# Patient Record
Sex: FEMALE | Race: ASIAN | NOT HISPANIC OR LATINO | URBAN - METROPOLITAN AREA
[De-identification: names, ages, dates, MRNs, and addresses within clinical notes are randomized per-mention and may not be internally consistent; named-entity substitution may affect disease eponyms.]

---

## 2021-08-24 ENCOUNTER — INPATIENT (INPATIENT)
Facility: HOSPITAL | Age: 71
LOS: 2 days | Discharge: HOME | End: 2021-08-27
Attending: INTERNAL MEDICINE | Admitting: INTERNAL MEDICINE
Payer: MEDICARE

## 2021-08-24 VITALS
TEMPERATURE: 97 F | DIASTOLIC BLOOD PRESSURE: 50 MMHG | SYSTOLIC BLOOD PRESSURE: 94 MMHG | HEART RATE: 94 BPM | RESPIRATION RATE: 18 BRPM | OXYGEN SATURATION: 98 %

## 2021-08-24 LAB
ALBUMIN SERPL ELPH-MCNC: 3.9 G/DL — SIGNIFICANT CHANGE UP (ref 3.3–5)
ALP SERPL-CCNC: 49 U/L — SIGNIFICANT CHANGE UP (ref 40–120)
ALT FLD-CCNC: 15 U/L — SIGNIFICANT CHANGE UP (ref 4–33)
ANION GAP SERPL CALC-SCNC: 14 MMOL/L — SIGNIFICANT CHANGE UP (ref 7–14)
APTT BLD: 27.1 SEC — SIGNIFICANT CHANGE UP (ref 27–36.3)
AST SERPL-CCNC: 15 U/L — SIGNIFICANT CHANGE UP (ref 4–32)
BASOPHILS # BLD AUTO: 0.02 K/UL — SIGNIFICANT CHANGE UP (ref 0–0.2)
BASOPHILS NFR BLD AUTO: 0.3 % — SIGNIFICANT CHANGE UP (ref 0–2)
BILIRUB SERPL-MCNC: 0.5 MG/DL — SIGNIFICANT CHANGE UP (ref 0.2–1.2)
BLD GP AB SCN SERPL QL: NEGATIVE — SIGNIFICANT CHANGE UP
BUN SERPL-MCNC: 32 MG/DL — HIGH (ref 7–23)
CALCIUM SERPL-MCNC: 8.4 MG/DL — SIGNIFICANT CHANGE UP (ref 8.4–10.5)
CHLORIDE SERPL-SCNC: 102 MMOL/L — SIGNIFICANT CHANGE UP (ref 98–107)
CO2 SERPL-SCNC: 19 MMOL/L — LOW (ref 22–31)
CREAT SERPL-MCNC: 0.56 MG/DL — SIGNIFICANT CHANGE UP (ref 0.5–1.3)
EOSINOPHIL # BLD AUTO: 0 K/UL — SIGNIFICANT CHANGE UP (ref 0–0.5)
EOSINOPHIL NFR BLD AUTO: 0 % — SIGNIFICANT CHANGE UP (ref 0–6)
GLUCOSE SERPL-MCNC: 218 MG/DL — HIGH (ref 70–99)
HCT VFR BLD CALC: 27.2 % — LOW (ref 34.5–45)
HGB BLD-MCNC: 8.9 G/DL — LOW (ref 11.5–15.5)
IANC: 5.63 K/UL — SIGNIFICANT CHANGE UP (ref 1.5–8.5)
IMM GRANULOCYTES NFR BLD AUTO: 0.6 % — SIGNIFICANT CHANGE UP (ref 0–1.5)
INR BLD: 1.2 RATIO — HIGH (ref 0.88–1.16)
LYMPHOCYTES # BLD AUTO: 0.68 K/UL — LOW (ref 1–3.3)
LYMPHOCYTES # BLD AUTO: 10 % — LOW (ref 13–44)
MCHC RBC-ENTMCNC: 31.2 PG — SIGNIFICANT CHANGE UP (ref 27–34)
MCHC RBC-ENTMCNC: 32.7 GM/DL — SIGNIFICANT CHANGE UP (ref 32–36)
MCV RBC AUTO: 95.4 FL — SIGNIFICANT CHANGE UP (ref 80–100)
MONOCYTES # BLD AUTO: 0.45 K/UL — SIGNIFICANT CHANGE UP (ref 0–0.9)
MONOCYTES NFR BLD AUTO: 6.6 % — SIGNIFICANT CHANGE UP (ref 2–14)
NEUTROPHILS # BLD AUTO: 5.63 K/UL — SIGNIFICANT CHANGE UP (ref 1.8–7.4)
NEUTROPHILS NFR BLD AUTO: 82.5 % — HIGH (ref 43–77)
NRBC # BLD: 0 /100 WBCS — SIGNIFICANT CHANGE UP
NRBC # FLD: 0 K/UL — SIGNIFICANT CHANGE UP
PLATELET # BLD AUTO: 170 K/UL — SIGNIFICANT CHANGE UP (ref 150–400)
POTASSIUM SERPL-MCNC: 4 MMOL/L — SIGNIFICANT CHANGE UP (ref 3.5–5.3)
POTASSIUM SERPL-SCNC: 4 MMOL/L — SIGNIFICANT CHANGE UP (ref 3.5–5.3)
PROT SERPL-MCNC: 6.4 G/DL — SIGNIFICANT CHANGE UP (ref 6–8.3)
PROTHROM AB SERPL-ACNC: 13.6 SEC — SIGNIFICANT CHANGE UP (ref 10.6–13.6)
RBC # BLD: 2.85 M/UL — LOW (ref 3.8–5.2)
RBC # FLD: 12.4 % — SIGNIFICANT CHANGE UP (ref 10.3–14.5)
RH IG SCN BLD-IMP: POSITIVE — SIGNIFICANT CHANGE UP
RH IG SCN BLD-IMP: POSITIVE — SIGNIFICANT CHANGE UP
SODIUM SERPL-SCNC: 135 MMOL/L — SIGNIFICANT CHANGE UP (ref 135–145)
WBC # BLD: 6.82 K/UL — SIGNIFICANT CHANGE UP (ref 3.8–10.5)
WBC # FLD AUTO: 6.82 K/UL — SIGNIFICANT CHANGE UP (ref 3.8–10.5)

## 2021-08-24 PROCEDURE — 71045 X-RAY EXAM CHEST 1 VIEW: CPT | Mod: 26

## 2021-08-24 RX ORDER — ONDANSETRON 8 MG/1
4 TABLET, FILM COATED ORAL ONCE
Refills: 0 | Status: COMPLETED | OUTPATIENT
Start: 2021-08-24 | End: 2021-08-24

## 2021-08-24 RX ORDER — PANTOPRAZOLE SODIUM 20 MG/1
80 TABLET, DELAYED RELEASE ORAL ONCE
Refills: 0 | Status: COMPLETED | OUTPATIENT
Start: 2021-08-24 | End: 2021-08-24

## 2021-08-24 RX ORDER — FAMOTIDINE 10 MG/ML
20 INJECTION INTRAVENOUS ONCE
Refills: 0 | Status: COMPLETED | OUTPATIENT
Start: 2021-08-24 | End: 2021-08-24

## 2021-08-24 RX ADMIN — FAMOTIDINE 20 MILLIGRAM(S): 10 INJECTION INTRAVENOUS at 21:45

## 2021-08-24 RX ADMIN — ONDANSETRON 4 MILLIGRAM(S): 8 TABLET, FILM COATED ORAL at 21:43

## 2021-08-24 RX ADMIN — PANTOPRAZOLE SODIUM 80 MILLIGRAM(S): 20 TABLET, DELAYED RELEASE ORAL at 21:40

## 2021-08-24 NOTE — ED PROVIDER NOTE - CLINICAL SUMMARY MEDICAL DECISION MAKING FREE TEXT BOX
69yo female with pmh htn presenting with hematemesis, abdominal pain.  Actively vomiting upon arrival.  Concern for complication 2/2 egd.  Not peritoneal on exam, lower concern for perforation.  Will get labs including type and monitor closely.  BP soft but hemodynamically stable currently.  Medicate and reassess after results.  Plan to admit.

## 2021-08-24 NOTE — ED ADULT TRIAGE NOTE - CHIEF COMPLAINT QUOTE
Pt c/o of weakness, blood in emesis and stool s/p endoscopy on 8/20. Pt appears lethargic, unsteady of gait, pale/jaudice appearing at triage. Charge nurse called, taken directly to TrA

## 2021-08-24 NOTE — ED PROVIDER NOTE - PHYSICAL EXAMINATION
General appearance: NAD, conversant, afebrile    Neck: Trachea midline; Full range of motion, supple   Pulm: CTA bl, normal respiratory effort and no intercostal retractions, normal work of breathing   CV: RRR, No murmurs, rubs, or gallops   Abdomen: Soft, non-tender, non-distended; no guarding or rebound   Extremities: No peripheral edema    Skin: Dry, normal temperature, turgor and texture; no rash, pale appearing   Psych: Appropriate affect, cooperative; alert and oriented to person, place and time

## 2021-08-24 NOTE — ED ADULT NURSE NOTE - OBJECTIVE STATEMENT
Pt received to YISEL Serrano&OX4, ambulatory. Non- english speaking, son at bedside for translation. Pt got endoscopy on 8/20 as Pt received to YISEL Serrano&OX4, ambulatory. Non- english speaking, primarily Fuijanese speaking. son Omar Doshi at bedside for translation. Pt got endoscopy on 8/20 as recommended by her GI doctor, son and pt state they are unsure as to why. C/o one episode of bloody stools around noon today and one episode of vomit around 2pm today. Pt currently c/o RUQ abd pain. Respirations even and unlabored. NSR on CM. Denies CP, SOB, dizziness, fatigue. 20G IV placed to RAC. Labs sent. Will continue to monitor.

## 2021-08-24 NOTE — ED PROVIDER NOTE - OBJECTIVE STATEMENT
71yo female with pmh htn presenting with hematemesis, abdominal pain.  4 days ago had egd for upper abdominal pain with biopsy performed.  Has been feeling generally unwell since that time.  Vomited bright red blood this afternoon and presented actively vomiting.  Procedure performed by [facility].

## 2021-08-24 NOTE — ED PROVIDER NOTE - PROGRESS NOTE DETAILS
Blake Andres DO PGY-3: pt received as a sing out [id number] - did not have baseline hemoglobin in chart - given pt had hematemesis - 1u prbc was ordered. pt did not have any episode of hematemesis since 11pm. BP has been MAP >65. Pt had continued to deny abd pain. Sent an email to GI. Attempted to admit the patient around 2a - hospitalist refused the admission since patient had a recent EGD - states patient should have a ctab to evl for perforation. Ct was ordered - still not done due to ER volume today. Pt continues to deny ab pain. WIll continue to monitor. Pt two functional IV lines. Rpt cbc is ordered. Lambert Luna MD - EM Fellow: 71yo F w/ PMHx of HTN reporting to ED with hematemesis. Patient signed out during routine sign out by colleague, Dr. Andres; please refer to colleague's note for further information regarding this patient. In brief, patient had hematemesis after prior endoscopy 4 days prior. Patient's Hgb downtrended from 9.5 to 8.9 and had received 1uPRBC. Prior to my evaluation. Patient was alert, orientated, and not in extremis upon my evaluation; abdominal exam reassuring. CT abdomen pending which ultimately showed no free air. Protonix bolus and drip. Zofran, fluids initiated. COVID-19 positive. Spoke with hospitalist, Dr. Rivera, who was understanding and agreeable to admit this patient. Patient admitted in stable condition.

## 2021-08-24 NOTE — ED PROVIDER NOTE - ATTENDING CONTRIBUTION TO CARE
I have seen and examined the patient on the patient´s visit date. I have reviewed the note written by Blake Andres DO, on that visit day. I have supervised and participated as necessary in the performance of procedures indicated for patient management and was available at all phases of the patient´s visit when needed. We discussed the history, physical exam findings, management plan, and  medical decision making. I have made my additions, exceptions, and revisions within the chart and I agree with H and P as documented in its entirety. The data and my interpretation of any data collected from labs, interventions and imaging appear below as well as my independent medical decision making and considerations.  CRITICAL CARE TIME WAS NECESSARY TO TREAT OR PREVENT LIFE THREATENING DETERIORATION FROM THE FOLLOWING CONDITIONS:  [ X ] Heart Failure and/or Circulatory Collapse [  ] Sepsis [  ] Respiratory failure  [  ]CNS Failure or Compromise    [  ]Dehydration [  ]Renal Failure [  ]Hepatic Failure Sepsis [  ]Endocrine Crisis  [  ]Metabolic Crisis  [  ]Toxidrome   [  ]Trauma    CRITICAL CARE TIME WAS SPENT BY ME IN THE FOLLOWING ACTIVITIES:  [X  ] Performance of the History and Physical Examination [X  ] Review of Old Charts [   ] IV Access and or Obtaining Necessary Diagnostic Tests   [X  ] Ordering and Performing Treatments and Interventions:   Specifically:  [  ] Ventilator Management [  ] Vascular Access Procedures [  ] NGT Placement  [  ] Transcutaneous Pacing  [ X ] Establishment of Goals of Care with the Patient or Their Designated Representative  [X  ] Developing and Evaluating Treatment Plan with Consultants and/or the Primary Provider  [ X ] Serial Reevaluation of the Patients Condition and Response to Therapeutic Measures   Specifically: [ X ] Interpretation of Cardiac and Respiratory Parameters  [X  ]Ordering and Interpretating  Diagnostic Studies  Comments:  The patient is a 71y Female who has a past medical and surgery history of HTN gastritis/PUD/HPylori infection PTED days post endoscopy with large volume hematemesis preceded by nausea but no profuse vomiting. EGD performed at outside institution pt /son not aware of results   ,      Vital Signs Last 24 Hrs   BP 94/50 HR 94 RR 18 SPO2 98% Tf= 97.2   PE: as described; my additions and exceptions are noted in the chart  DATA:  EKG: Normal; NSR@88  LABS: All significant/relevant labs and imaging results present during the time of evaluation have been entered into chart through pullset function and are discussed below    MDM:  IMPRESSION: Large volume UGIB  Considerations; permissive hypotension with early blood product support to optimize for inpt repeat EGD  PLAN  As above  PPI iV   no suspicion for varices so will hold abx  early GI consult  serial cbcs  tx prbcs  for goal of sbp 90's (map60) HR 90s pOX in 90s and 1/2 cc/kg/hr urine output  admit

## 2021-08-25 DIAGNOSIS — D64.9 ANEMIA, UNSPECIFIED: ICD-10-CM

## 2021-08-25 DIAGNOSIS — K92.0 HEMATEMESIS: ICD-10-CM

## 2021-08-25 DIAGNOSIS — Z29.9 ENCOUNTER FOR PROPHYLACTIC MEASURES, UNSPECIFIED: ICD-10-CM

## 2021-08-25 DIAGNOSIS — U07.1 COVID-19: ICD-10-CM

## 2021-08-25 DIAGNOSIS — I10 ESSENTIAL (PRIMARY) HYPERTENSION: ICD-10-CM

## 2021-08-25 LAB
CRP SERPL-MCNC: <4 MG/L — SIGNIFICANT CHANGE UP
D DIMER BLD IA.RAPID-MCNC: 342 NG/ML DDU — HIGH
FERRITIN SERPL-MCNC: 119 NG/ML — SIGNIFICANT CHANGE UP (ref 15–150)
HCT VFR BLD CALC: 28 % — LOW (ref 34.5–45)
HCT VFR BLD CALC: 30.7 % — LOW (ref 34.5–45)
HGB BLD-MCNC: 10.1 G/DL — LOW (ref 11.5–15.5)
HGB BLD-MCNC: 9.5 G/DL — LOW (ref 11.5–15.5)
LDH SERPL L TO P-CCNC: 161 U/L — SIGNIFICANT CHANGE UP (ref 135–225)
MCHC RBC-ENTMCNC: 30.9 PG — SIGNIFICANT CHANGE UP (ref 27–34)
MCHC RBC-ENTMCNC: 31.4 PG — SIGNIFICANT CHANGE UP (ref 27–34)
MCHC RBC-ENTMCNC: 32.9 GM/DL — SIGNIFICANT CHANGE UP (ref 32–36)
MCHC RBC-ENTMCNC: 33.9 GM/DL — SIGNIFICANT CHANGE UP (ref 32–36)
MCV RBC AUTO: 92.4 FL — SIGNIFICANT CHANGE UP (ref 80–100)
MCV RBC AUTO: 93.9 FL — SIGNIFICANT CHANGE UP (ref 80–100)
NRBC # BLD: 0 /100 WBCS — SIGNIFICANT CHANGE UP
NRBC # BLD: 0 /100 WBCS — SIGNIFICANT CHANGE UP
NRBC # FLD: 0 K/UL — SIGNIFICANT CHANGE UP
NRBC # FLD: 0 K/UL — SIGNIFICANT CHANGE UP
PLATELET # BLD AUTO: 133 K/UL — LOW (ref 150–400)
PLATELET # BLD AUTO: 141 K/UL — LOW (ref 150–400)
PROCALCITONIN SERPL-MCNC: 0.12 NG/ML — HIGH (ref 0.02–0.1)
RBC # BLD: 3.03 M/UL — LOW (ref 3.8–5.2)
RBC # BLD: 3.27 M/UL — LOW (ref 3.8–5.2)
RBC # FLD: 13.4 % — SIGNIFICANT CHANGE UP (ref 10.3–14.5)
RBC # FLD: 14 % — SIGNIFICANT CHANGE UP (ref 10.3–14.5)
SARS-COV-2 RNA SPEC QL NAA+PROBE: DETECTED
WBC # BLD: 5.65 K/UL — SIGNIFICANT CHANGE UP (ref 3.8–10.5)
WBC # BLD: 6.13 K/UL — SIGNIFICANT CHANGE UP (ref 3.8–10.5)
WBC # FLD AUTO: 5.65 K/UL — SIGNIFICANT CHANGE UP (ref 3.8–10.5)
WBC # FLD AUTO: 6.13 K/UL — SIGNIFICANT CHANGE UP (ref 3.8–10.5)

## 2021-08-25 PROCEDURE — 74176 CT ABD & PELVIS W/O CONTRAST: CPT | Mod: 26

## 2021-08-25 PROCEDURE — 99223 1ST HOSP IP/OBS HIGH 75: CPT

## 2021-08-25 RX ORDER — PANTOPRAZOLE SODIUM 20 MG/1
8 TABLET, DELAYED RELEASE ORAL
Qty: 80 | Refills: 0 | Status: DISCONTINUED | OUTPATIENT
Start: 2021-08-25 | End: 2021-08-27

## 2021-08-25 RX ORDER — OMEPRAZOLE 10 MG/1
1 CAPSULE, DELAYED RELEASE ORAL
Qty: 0 | Refills: 0 | DISCHARGE

## 2021-08-25 RX ORDER — CHOLECALCIFEROL (VITAMIN D3) 125 MCG
1 CAPSULE ORAL
Qty: 0 | Refills: 0 | DISCHARGE

## 2021-08-25 RX ADMIN — PANTOPRAZOLE SODIUM 10 MG/HR: 20 TABLET, DELAYED RELEASE ORAL at 11:14

## 2021-08-25 NOTE — H&P ADULT - PROBLEM SELECTOR PLAN 2
Anemia work up  (iron studies, B12, Folate.)   Will monitor H/H.    Pt currently asymptomatic and hemodynamically stable.   Guaiac stools.  GI consult recs noted

## 2021-08-25 NOTE — H&P ADULT - NEGATIVE RESPIRATORY AND THORAX SYMPTOMS
no wheezing/no dyspnea/no cough Melolabial Transposition Flap Text: The defect edges were debeveled with a #15 scalpel blade.  Given the location of the defect and the proximity to free margins a melolabial flap was deemed most appropriate.  Using a sterile surgical marker, an appropriate melolabial transposition flap was drawn incorporating the defect.    The area thus outlined was incised deep to adipose tissue with a #15 scalpel blade.  The skin margins were undermined to an appropriate distance in all directions utilizing iris scissors.

## 2021-08-25 NOTE — ED ADULT NURSE REASSESSMENT NOTE - NS ED NURSE REASSESS COMMENT FT1
report received from night RN. Pt A&Ox4, ambulatory with assistance d/t weakness. NSR on cardiac monitor. Respirations even and unlabored on room air. Isolation precautions maintained for + COVID. Pt assisted to use bed pan. Fall and safety precautions maintained. Pt taken to CT scan. Pending dispo.

## 2021-08-25 NOTE — H&P ADULT - HISTORY OF PRESENT ILLNESS
69 y/o female with PMHx of HTN, H pylori s/p treatment in 2005, ovarian tumor s/p removal in 2013 presents to ED vomiting bloody material and abdominal pain. Patient and son states she had EGD 4 days ago with  Dr Leija for biopsy of 8-9 mm gastric mass. She states she felt poorly ever since the endoscopy and yesterday she had one bout of hematemesis.  She states this was bright red blood with a couple "dark flecks" of material intermixed. Patient was actively vomiting in the ED. Patient denies fever/chills, fatigue, weakness, weight change, chest pain, difficulty breathing, change in bowel movements, dysuria, change in urination, lower extremity edema, hemiparesis, change in gait, appetite changes, rashes.       In ED, patient was found to be Covid positive. As per son, patient was vaccinated with Pfizer vaccine, last dose 3/27/21. Patient was tested prior to EGD 4 days ago. 71 y/o female with PMHx of HTN, H pylori s/p treatment in 2005, ovarian tumor s/p removal in 2013 presents to ED vomiting bloody material and abdominal pain. Patient and son states she had EGD 4 days ago with  Dr Leija for biopsy of 8-9 mm gastric mass. She states she felt poorly ever since the endoscopy and yesterday she had one bout of hematemesis.  She states this was bright red blood with a couple "dark flecks" of material intermixed. Patient was actively vomiting in the ED. Patient denies fever/chills, fatigue, weakness, weight change, chest pain, difficulty breathing, change in bowel movements, dysuria, change in urination, lower extremity edema, hemiparesis, change in gait, appetite changes, rashes.       In ED, patient was found to be Covid positive. As per son, patient was vaccinated with Pfizer vaccine, last dose 3/27/21. Patient was not tested prior to EGD 4 days ago.

## 2021-08-25 NOTE — H&P ADULT - ATTENDING COMMENTS
69 y/o woman with HTN, h pylori s/p tx, and known 8mm gastric mass s/p EGD 4 days ago presents to the ER not feeling well since EGD and with an episode of hematemesis. Pt also reported nausea and continued vomiting in the ER. Found covid19 positive, denied sob but had some cough. Suspect overall malaise and portion of n/v likely from COVID19 infection with hematemesis a  result of recent biopsy with vomiting. CT abdomen without perf. Pt denies any nausea at present nor further hematemesis. Continue IV PPI BID, will give clears. GI recs appreciated. trend cbc 71 y/o woman with HTN, h pylori s/p tx, and known 8mm gastric mass s/p EGD 4 days ago presents to the ER not feeling well since EGD and with an episode of hematemesis. Pt also reported nausea and continued vomiting in the ER. Found covid19 positive, denied sob but had some cough. Suspect overall malaise and portion of n/v likely from COVID19 infection with hematemesis a  result of recent biopsy with vomiting. CT abdomen without perf. Pt denies any nausea at present nor further hematemesis. Continue IV PPI, will give clears. GI recs appreciated. trend cbc

## 2021-08-25 NOTE — H&P ADULT - ASSESSMENT
71 y/o female with PMHx of HTN, H pylori s/p treatment in 2005, ovarian tumor s/p removal in 2013 presents to ED vomiting bloody material and abdominal pain post EGD 4 days ago with  Dr Leija for biopsy of 8-9 mm gastric mass. Admitted for hematemesis management.   In ED, patient was found to be Covid positive. As per son, patient was vaccinated with Pfizer vaccine, last dose 3/27/21. Patient was tested prior to EGD 4 days ago.        71 y/o female with PMHx of HTN, H pylori s/p treatment in 2005, ovarian tumor s/p removal in 2013 presents to ED vomiting bloody material and abdominal pain post EGD 4 days ago with  Dr Leija for biopsy of 8-9 mm gastric mass. Admitted for hematemesis management.   In ED, patient was found to be Covid positive. As per son, patient was vaccinated with Pfizer vaccine, last dose 3/27/21. Patient was not tested prior to EGD 4 days ago.

## 2021-08-25 NOTE — H&P ADULT - PROBLEM SELECTOR PLAN 3
- Maintain on airborne isolation.  - Continue with O2 as needed via nasal cannula and up-titrate as needed.  Start continuous oximetry monitoring.  - Obtain daily room air O2 saturations once O2 requirements stabilize.  - Acetaminophen 650 mg PO q4h PRN fever.   - Cough suppression with Robitussin DM PRN  - Labs Testing: Daily CBC w/ diff, CMP; Every 3 days: CRP, Ferritin, Procalcitonin.  - Will consider need for extended prophylaxis prior to discharge based on D-Dimer and calculated VTE risk.

## 2021-08-25 NOTE — CONSULT NOTE ADULT - SUBJECTIVE AND OBJECTIVE BOX
Chief Complaint:  Patient is a 70y old  Female who presents with a chief complaint of     HPI:  DEVAN BROOKS is a 70year old female with history of hypertension who presents with one episode of hematemesis.    Spoke with patient and patient's son [WATSON] who was able to translate.  Patient reportedly had an endoscopy for epigastric pain/discomfort at her primary GI physician, Dr. Omid Leija, in [place] approximately 4 days prior.  She states she felt poorly ever since the endoscopy and yesterday she had one bout of hematemesis.  She states this was bright red blood with a couple "dark flecks" of material intermixed.  No further bouts of hematemesis.  She also now endorses a cough and COVID PCR positive in the ED.  Otherwise, patient denies weight loss, dysphagia, odynophagia, early satiety, poor oral intake, nausea, vomiting, diarrhea, melena, hematochezia, change in stool caliber, or family history of GI-related cancers.    ROS:   General:  No fevers, chills, night sweats  Eyes:  Good vision, no reported pain  ENT:  No sore throat, pain, runny nose  CV:  No pain, palpitations  Pulm:  +cough  GI:  See HPI, otherwise negative  :  No incontinence, nocturia  Muscle:  No reported pain, weakness  Neuro:  No memory problems  Psych:  No insomnia, psychosis  Endocrine:  No polyuria, polydipsia  Heme:  No petechiae, ecchymosis, easy bruisability  Skin:  No reported rash    PMHX/PSHX:    HTN (hypertension)      Allergies:  No Known Allergies      Home Medications: reviewed  Hospital Medications:  pantoprazole Infusion 8 mG/Hr IV Continuous       Social History:   Tobacco: Denies  EtOH: Denies  Illicit Drugs: Denies    Family history:    Patient's father had gastric cancer.    PHYSICAL EXAM:   Vital Signs:  Vital Signs Last 24 Hrs  T(C): 36.9 (25 Aug 2021 10:16), Max: 37.7 (25 Aug 2021 03:27)  T(F): 98.5 (25 Aug 2021 10:16), Max: 99.8 (25 Aug 2021 03:27)  HR: 76 (25 Aug 2021 10:16) (75 - 94)  BP: 106/63 (25 Aug 2021 10:16) (93/54 - 110/69)  BP(mean): 72 (25 Aug 2021 03:27) (72 - 74)  RR: 17 (25 Aug 2021 10:16) (17 - 22)  SpO2: 100% (25 Aug 2021 10:16) (98% - 100%)  Daily     Daily     GENERAL: no acute distress  NEURO: alert  HEENT: anicteric sclera, no conjunctival pallor appreciated  CHEST: no respiratory distress, no accessory muscle use  CARDIAC: regular rate, +S1/S2  ABDOMEN: soft, nondistended, nontender, no rebound or guarding  EXTREMITIES: warm, well perfused, no edema  SKIN: no lesions noted    LABS: reviewed                        9.5    5.65  )-----------( 141      ( 25 Aug 2021 06:07 )             28.0     08-24    135  |  102  |  32<H>  ----------------------------<  218<H>  4.0   |  19<L>  |  0.56    Ca    8.4      24 Aug 2021 21:31    TPro  6.4  /  Alb  3.9  /  TBili  0.5  /  DBili  x   /  AST  15  /  ALT  15  /  AlkPhos  49  08-24    LIVER FUNCTIONS - ( 24 Aug 2021 21:31 )  Alb: 3.9 g/dL / Pro: 6.4 g/dL / ALK PHOS: 49 U/L / ALT: 15 U/L / AST: 15 U/L / GGT: x               Diagnostic Studies: see sunrise for full report

## 2021-08-25 NOTE — ED ADULT NURSE REASSESSMENT NOTE - NS ED NURSE REASSESS COMMENT FT1
No [facility]  available via pacific . Pt.'s son called for translation. no signs/symptoms of transfusion reaction noted at present. Denies SOB, itchiness, hives, chills, back pain, abd pain, chest pain. Respirations even & unlabored. Will continue to monitor.

## 2021-08-25 NOTE — H&P ADULT - PROBLEM SELECTOR PLAN 4
- Will hold home meds for now due to GIB and soft BP- last one 109/66  - monitor BP & titrate BP meds PRN once cleared by GI.

## 2021-08-25 NOTE — H&P ADULT - PROBLEM SELECTOR PLAN 1
NPO  GI consult recs noted  Protonix 40 mg IV Q12H,   serial Hgb/Hct Q6H & transfuse PRN if Hgb < 7 - Patient received 1 unit of PRBC  Consider bleeding scan/Octreotide / or repeat EGD if H/H drops  2 large bore IV  type & screen,   Patient currently hemodynamically stable.

## 2021-08-25 NOTE — CONSULT NOTE ADULT - ATTENDING COMMENTS
70 year old female with epigastric pain. Pt had an EGD 4 days ago. Presented with hematemesis x 1. Now Covid positive.     Plan: Will obtain EGD report for outside GI.

## 2021-08-25 NOTE — ED ADULT NURSE REASSESSMENT NOTE - NS ED NURSE REASSESS COMMENT FT1
Break Coverage RN - Report received from JAK Shook. Pt. received with 18G IV in left ac & 20G IV in right ac, positive blood return, flushes without difficulty. A&Ox4. Pt. is Criss speaking and requests son, Tico Jeronimo, at bedside to translate. Pt. educated on signs/symptoms of transfusion reaction and demonstrated understanding to notify RN if experiences SOB, itchiness, hives, chills, back pain. VS as noted. MD Mccloud aware of pt. vital signs, as per MD, can begin PRBC infusion with current VS. PRBC to be administered. Will continue to monitor. Break Coverage RN - Report received from JAK Shook. Pt. received with 18G IV in left ac & 20G IV in right ac, positive blood return, flushes without difficulty. A&Ox4. Pt. is Criss speaking and requests son, Tico Jeronimo, at bedside to translate. Pt. educated on signs/symptoms of transfusion reaction and demonstrated understanding to notify RN if experiences SOB, itchiness, hives, chills, back pain. Consent for blood products signed and in chart. VS as noted. MD Mccloud aware of pt. vital signs, as per MD, can begin PRBC infusion with current VS. PRBC to be administered. Will continue to monitor.

## 2021-08-25 NOTE — CONSULT NOTE ADULT - ASSESSMENT
Edi Allison is a 70 year old female with history of hypertension, H pylori s/p treatment in 2005, ovarian tumor s/p removal in 2013 who presents with one episode of hematemesis.    # Hematemesis  # 8-9mm gastric mass  # History of H pylori s/p treatment  # COVID-19 positive  One episode of hematemesis in the setting of recent EGD 4 days prior at private .  Patient also tested positive for COVID-19 despite being vaccinated.  Differential diagnosis of upper GI bleeding includes esophagitis, gastritis, peptic ulcer disease, H pylori infection, varices, portal hypertensive gastropathy, GAVE, arteriovenous malformation [AVM], malignancy, Monika-Marcus tear, Dieulafoy lesion, or Brett lesions.  Patient does have family history of gastric cancer in patient's father and is being followed by private GI physician for 8-9mm gastric mass.  Biopsies from 8/20/2021 remain pending.  Records obtained from private GI physician, Dr. Omid Leija, in [place]:  2005: underwent treatment for H pylori  EGD 9/25/2019: linear erythema and irregular Z-line [negative for Trinidad's], gastritis, 8mm mass in gastric body along greater curvature  Colonoscopy 9/30/2019: 2-3mm polyp in proximal transverse colon, internal hemorrhoids  EGD 8/20/2021: 8-9mm mass in gastric body along greater curvature [pathology from gastric biopsies pending], gastritis in antrum and body, non-erosive esophagitis    Recommendations:  -management of COVID-19 per primary team-trend vitals, CBC, and monitor for clinical signs of bleeding  -maintain active type and screen  -transfusion goal to maintain hemoglobin >/= 7.0  -rule out other causes for anemia [consider iron studies, ferritin, vitamin B12, folate]  -avoid NSAIDs  -PPI IV BID acceptable for now  -obtained records from private GI physician's office, Dr. Omid Leija, in [place].  Records of reports as above.  -would consider repeat endoscopy if patient develops worsening anemia without clear etiology, signs of overt GI bleeding, and medically optimized prior to procedure.  However, given that patient is COVID-19 PCR positive would favor conservative management      Tiburcio Wong, PGY-4  GI/Hepatology Fellow    MONDAY-FRIDAY 8AM-5PM:  Available via [employer] Teams  Pager# [phone] ([facility]) or [phone] ([facility])  GI Phone# [phone] ([facility])    NON-URGENT CONSULTS:  Please email [email or web address]  AT NIGHT AND ON WEEKENDS:  Contact on-call GI fellow via answering service ([phone]) from 5pm-8am and on weekends/holidays

## 2021-08-25 NOTE — ED ADULT NURSE REASSESSMENT NOTE - NS ED NURSE REASSESS COMMENT FT1
VS as noted. Pt. son at bedside for translation. No signs/symptoms of transfusion reaction noted at present. Denies SOB, back pain, chills, itchiness. VS as noted. PRBC infusing as per order. Will continue to montior.

## 2021-08-26 LAB
A1C WITH ESTIMATED AVERAGE GLUCOSE RESULT: 5.6 % — SIGNIFICANT CHANGE UP (ref 4–5.6)
ALBUMIN SERPL ELPH-MCNC: 3.4 G/DL — SIGNIFICANT CHANGE UP (ref 3.3–5)
ALP SERPL-CCNC: 44 U/L — SIGNIFICANT CHANGE UP (ref 40–120)
ALT FLD-CCNC: 13 U/L — SIGNIFICANT CHANGE UP (ref 4–33)
ANION GAP SERPL CALC-SCNC: 10 MMOL/L — SIGNIFICANT CHANGE UP (ref 7–14)
AST SERPL-CCNC: 16 U/L — SIGNIFICANT CHANGE UP (ref 4–32)
BILIRUB SERPL-MCNC: 0.5 MG/DL — SIGNIFICANT CHANGE UP (ref 0.2–1.2)
BUN SERPL-MCNC: 23 MG/DL — SIGNIFICANT CHANGE UP (ref 7–23)
CALCIUM SERPL-MCNC: 8.3 MG/DL — LOW (ref 8.4–10.5)
CHLORIDE SERPL-SCNC: 104 MMOL/L — SIGNIFICANT CHANGE UP (ref 98–107)
CO2 SERPL-SCNC: 20 MMOL/L — LOW (ref 22–31)
COVID-19 SPIKE DOMAIN AB INTERP: POSITIVE
COVID-19 SPIKE DOMAIN ANTIBODY RESULT: 147 U/ML — HIGH
CREAT SERPL-MCNC: 0.5 MG/DL — SIGNIFICANT CHANGE UP (ref 0.5–1.3)
ESTIMATED AVERAGE GLUCOSE: 114 — SIGNIFICANT CHANGE UP
FERRITIN SERPL-MCNC: 118 NG/ML — SIGNIFICANT CHANGE UP (ref 15–150)
GLUCOSE SERPL-MCNC: 99 MG/DL — SIGNIFICANT CHANGE UP (ref 70–99)
HCT VFR BLD CALC: 27.3 % — LOW (ref 34.5–45)
HCT VFR BLD CALC: 27.8 % — LOW (ref 34.5–45)
HCV AB S/CO SERPL IA: 0.21 S/CO — SIGNIFICANT CHANGE UP (ref 0–0.99)
HCV AB SERPL-IMP: SIGNIFICANT CHANGE UP
HGB BLD-MCNC: 9.2 G/DL — LOW (ref 11.5–15.5)
HGB BLD-MCNC: 9.3 G/DL — LOW (ref 11.5–15.5)
IRON SATN MFR SERPL: 12 % — LOW (ref 14–50)
IRON SATN MFR SERPL: 24 UG/DL — LOW (ref 30–160)
MAGNESIUM SERPL-MCNC: 2.1 MG/DL — SIGNIFICANT CHANGE UP (ref 1.6–2.6)
MCHC RBC-ENTMCNC: 30.9 PG — SIGNIFICANT CHANGE UP (ref 27–34)
MCHC RBC-ENTMCNC: 31 PG — SIGNIFICANT CHANGE UP (ref 27–34)
MCHC RBC-ENTMCNC: 33.1 GM/DL — SIGNIFICANT CHANGE UP (ref 32–36)
MCHC RBC-ENTMCNC: 34.1 GM/DL — SIGNIFICANT CHANGE UP (ref 32–36)
MCV RBC AUTO: 91 FL — SIGNIFICANT CHANGE UP (ref 80–100)
MCV RBC AUTO: 93.3 FL — SIGNIFICANT CHANGE UP (ref 80–100)
NRBC # BLD: 0 /100 WBCS — SIGNIFICANT CHANGE UP
NRBC # BLD: 0 /100 WBCS — SIGNIFICANT CHANGE UP
NRBC # FLD: 0 K/UL — SIGNIFICANT CHANGE UP
NRBC # FLD: 0 K/UL — SIGNIFICANT CHANGE UP
PHOSPHATE SERPL-MCNC: 2.9 MG/DL — SIGNIFICANT CHANGE UP (ref 2.5–4.5)
PLATELET # BLD AUTO: 121 K/UL — LOW (ref 150–400)
PLATELET # BLD AUTO: 123 K/UL — LOW (ref 150–400)
POTASSIUM SERPL-MCNC: 3.7 MMOL/L — SIGNIFICANT CHANGE UP (ref 3.5–5.3)
POTASSIUM SERPL-SCNC: 3.7 MMOL/L — SIGNIFICANT CHANGE UP (ref 3.5–5.3)
PROT SERPL-MCNC: 6.3 G/DL — SIGNIFICANT CHANGE UP (ref 6–8.3)
RBC # BLD: 2.98 M/UL — LOW (ref 3.8–5.2)
RBC # BLD: 3 M/UL — LOW (ref 3.8–5.2)
RBC # FLD: 13.2 % — SIGNIFICANT CHANGE UP (ref 10.3–14.5)
RBC # FLD: 13.9 % — SIGNIFICANT CHANGE UP (ref 10.3–14.5)
SARS-COV-2 IGG+IGM SERPL QL IA: 147 U/ML — HIGH
SARS-COV-2 IGG+IGM SERPL QL IA: POSITIVE
SODIUM SERPL-SCNC: 134 MMOL/L — LOW (ref 135–145)
TIBC SERPL-MCNC: 202 UG/DL — LOW (ref 220–430)
UIBC SERPL-MCNC: 178 UG/DL — SIGNIFICANT CHANGE UP (ref 110–370)
WBC # BLD: 4.34 K/UL — SIGNIFICANT CHANGE UP (ref 3.8–10.5)
WBC # BLD: 5.76 K/UL — SIGNIFICANT CHANGE UP (ref 3.8–10.5)
WBC # FLD AUTO: 4.34 K/UL — SIGNIFICANT CHANGE UP (ref 3.8–10.5)
WBC # FLD AUTO: 5.76 K/UL — SIGNIFICANT CHANGE UP (ref 3.8–10.5)

## 2021-08-26 PROCEDURE — 99232 SBSQ HOSP IP/OBS MODERATE 35: CPT | Mod: GC

## 2021-08-26 RX ADMIN — PANTOPRAZOLE SODIUM 10 MG/HR: 20 TABLET, DELAYED RELEASE ORAL at 19:55

## 2021-08-26 NOTE — PROGRESS NOTE ADULT - PROBLEM SELECTOR PLAN 2
Anemia work up  (iron studies, B12, Folate.)   Will monitor H/H.    Pt currently asymptomatic and hemodynamically stable.     GI consult recs noted

## 2021-08-26 NOTE — PROGRESS NOTE ADULT - PROBLEM SELECTOR PLAN 1
GI consult recs noted  Protonix 40 mg IV Q12H,   serial Hgb/Hct Q6H & transfuse PRN if Hgb < 7 - Patient received 1 unit of PRBC  Consider bleeding scan/Octreotide / or repeat EGD if H/H drops

## 2021-08-26 NOTE — PROGRESS NOTE ADULT - PROBLEM SELECTOR PLAN 3
- Maintain on airborne isolation.  - Continue with O2 as needed via nasal cannula and up-titrate as needed.

## 2021-08-27 VITALS
OXYGEN SATURATION: 99 % | HEART RATE: 72 BPM | TEMPERATURE: 99 F | SYSTOLIC BLOOD PRESSURE: 124 MMHG | DIASTOLIC BLOOD PRESSURE: 71 MMHG | RESPIRATION RATE: 17 BRPM

## 2021-08-27 LAB
ALBUMIN SERPL ELPH-MCNC: 3.7 G/DL — SIGNIFICANT CHANGE UP (ref 3.3–5)
ALP SERPL-CCNC: 48 U/L — SIGNIFICANT CHANGE UP (ref 40–120)
ALT FLD-CCNC: 16 U/L — SIGNIFICANT CHANGE UP (ref 4–33)
ANION GAP SERPL CALC-SCNC: 12 MMOL/L — SIGNIFICANT CHANGE UP (ref 7–14)
AST SERPL-CCNC: 20 U/L — SIGNIFICANT CHANGE UP (ref 4–32)
BASOPHILS # BLD AUTO: 0 K/UL — SIGNIFICANT CHANGE UP (ref 0–0.2)
BASOPHILS NFR BLD AUTO: 0 % — SIGNIFICANT CHANGE UP (ref 0–2)
BILIRUB SERPL-MCNC: 0.5 MG/DL — SIGNIFICANT CHANGE UP (ref 0.2–1.2)
BUN SERPL-MCNC: 18 MG/DL — SIGNIFICANT CHANGE UP (ref 7–23)
CALCIUM SERPL-MCNC: 8.5 MG/DL — SIGNIFICANT CHANGE UP (ref 8.4–10.5)
CHLORIDE SERPL-SCNC: 104 MMOL/L — SIGNIFICANT CHANGE UP (ref 98–107)
CO2 SERPL-SCNC: 22 MMOL/L — SIGNIFICANT CHANGE UP (ref 22–31)
CREAT SERPL-MCNC: 0.53 MG/DL — SIGNIFICANT CHANGE UP (ref 0.5–1.3)
EOSINOPHIL # BLD AUTO: 0.06 K/UL — SIGNIFICANT CHANGE UP (ref 0–0.5)
EOSINOPHIL NFR BLD AUTO: 1.1 % — SIGNIFICANT CHANGE UP (ref 0–6)
GLUCOSE SERPL-MCNC: 115 MG/DL — HIGH (ref 70–99)
HCT VFR BLD CALC: 27.9 % — LOW (ref 34.5–45)
HGB BLD-MCNC: 9.4 G/DL — LOW (ref 11.5–15.5)
IANC: 3.41 K/UL — SIGNIFICANT CHANGE UP (ref 1.5–8.5)
IMM GRANULOCYTES NFR BLD AUTO: 0.4 % — SIGNIFICANT CHANGE UP (ref 0–1.5)
LYMPHOCYTES # BLD AUTO: 1.51 K/UL — SIGNIFICANT CHANGE UP (ref 1–3.3)
LYMPHOCYTES # BLD AUTO: 27.4 % — SIGNIFICANT CHANGE UP (ref 13–44)
MCHC RBC-ENTMCNC: 31.3 PG — SIGNIFICANT CHANGE UP (ref 27–34)
MCHC RBC-ENTMCNC: 33.7 GM/DL — SIGNIFICANT CHANGE UP (ref 32–36)
MCV RBC AUTO: 93 FL — SIGNIFICANT CHANGE UP (ref 80–100)
MONOCYTES # BLD AUTO: 0.51 K/UL — SIGNIFICANT CHANGE UP (ref 0–0.9)
MONOCYTES NFR BLD AUTO: 9.3 % — SIGNIFICANT CHANGE UP (ref 2–14)
NEUTROPHILS # BLD AUTO: 3.41 K/UL — SIGNIFICANT CHANGE UP (ref 1.8–7.4)
NEUTROPHILS NFR BLD AUTO: 61.8 % — SIGNIFICANT CHANGE UP (ref 43–77)
NRBC # BLD: 0 /100 WBCS — SIGNIFICANT CHANGE UP
NRBC # FLD: 0 K/UL — SIGNIFICANT CHANGE UP
PLATELET # BLD AUTO: 125 K/UL — LOW (ref 150–400)
POTASSIUM SERPL-MCNC: 4.1 MMOL/L — SIGNIFICANT CHANGE UP (ref 3.5–5.3)
POTASSIUM SERPL-SCNC: 4.1 MMOL/L — SIGNIFICANT CHANGE UP (ref 3.5–5.3)
PROT SERPL-MCNC: 6.4 G/DL — SIGNIFICANT CHANGE UP (ref 6–8.3)
RBC # BLD: 3 M/UL — LOW (ref 3.8–5.2)
RBC # FLD: 13 % — SIGNIFICANT CHANGE UP (ref 10.3–14.5)
SODIUM SERPL-SCNC: 138 MMOL/L — SIGNIFICANT CHANGE UP (ref 135–145)
WBC # BLD: 5.51 K/UL — SIGNIFICANT CHANGE UP (ref 3.8–10.5)
WBC # FLD AUTO: 5.51 K/UL — SIGNIFICANT CHANGE UP (ref 3.8–10.5)

## 2021-08-27 PROCEDURE — 99232 SBSQ HOSP IP/OBS MODERATE 35: CPT | Mod: GC

## 2021-08-27 RX ORDER — LOSARTAN POTASSIUM 100 MG/1
1 TABLET, FILM COATED ORAL
Qty: 0 | Refills: 0 | DISCHARGE

## 2021-08-27 RX ORDER — ASPIRIN/CALCIUM CARB/MAGNESIUM 324 MG
1 TABLET ORAL
Qty: 0 | Refills: 0 | DISCHARGE

## 2021-08-27 RX ADMIN — PANTOPRAZOLE SODIUM 10 MG/HR: 20 TABLET, DELAYED RELEASE ORAL at 06:06

## 2021-08-27 NOTE — DISCHARGE NOTE PROVIDER - NSDCCPCAREPLAN_GEN_ALL_CORE_FT
PRINCIPAL DISCHARGE DIAGNOSIS  Diagnosis: Hematemesis  Assessment and Plan of Treatment: You were treated for vomiting blood following the endoscopy procedure you recently had.  Your blood levels were monitored daily and were stable for discharge as per attending.  Follow up with your primary care provider 1-2 weeks as directed.

## 2021-08-27 NOTE — DISCHARGE NOTE PROVIDER - HOSPITAL COURSE
71 y/o female with PMHx of HTN, H pylori s/p treatment in 2005, ovarian tumor s/p removal in 2013 presents to ED vomiting bloody material and abdominal pain post EGD 4 days ago with  Dr Leija for biopsy of 8-9 mm gastric mass. Admitted for hematemesis management.   In ED, patient was found to be Covid positive. As per son, patient was vaccinated with Pfizer vaccine, last dose 3/27/21. Patient was not tested prior to EGD 4 days ago.    Hematemesis.   GI consult recs noted  Protonix 40 mg IV Q12H,   serial Hgb/Hct Q6H & transfuse PRN if Hgb < 7 - Patient received 1 unit of PRBC  Consider bleeding scan/Octreotide / or repeat EGD if H/H drops.    Anemia.   Anemia work up  (iron studies, B12, Folate.)   Will monitor H/H.   GI consult recs noted.     2019 novel coronavirus disease (COVID-19).   - Maintain on airborne isolation.  - Continue with O2 as needed via nasal cannula and up-titrate as needed.    HTN (hypertension).   - Will hold home meds for now due to GIB and soft BP- last one 109/66  - monitor BP & titrate BP meds PRN once cleared by GI.      On 8/27/21, case was discussed with Dr. Truong, patient is medically cleared and optimized for discharge today. All medications were reviewed with attending, and sent to mutually agreed upon pharmacy

## 2021-08-27 NOTE — DISCHARGE NOTE PROVIDER - PROVIDER TOKENS
PROVIDER:[TOKEN:[[id number]],ESTABLISHEDPATIENT:[T]] PROVIDER:[TOKEN:[[id number]],ESTABLISHEDPATIENT:[T]],PROVIDER:[TOKEN:[[id number]],ESTABLISHEDPATIENT:[T]]

## 2021-08-27 NOTE — PROGRESS NOTE ADULT - SUBJECTIVE AND OBJECTIVE BOX
Patient is a 70y old  Female who presents with a chief complaint of Vomiting blood (25 Aug 2021 16:15)      Interval Events:   No acute events overnight.  No further bleeding, hemoglobin slightly down from previous but overall stable.  Patient's son translates over the phone. Patient denies any acute symptoms at this time.    ROS:   A 12-point ROS was performed and negative except as noted in HPI.    Hospital Medications:  pantoprazole Infusion 8 mG/Hr IV Continuous <Continuous>      PHYSICAL EXAM:   Vital Signs:  Vital Signs Last 24 Hrs  T(C): 36.6 (26 Aug 2021 06:46), Max: 37.1 (25 Aug 2021 20:52)  T(F): 97.9 (26 Aug 2021 06:46), Max: 98.7 (25 Aug 2021 20:52)  HR: 77 (26 Aug 2021 06:46) (75 - 83)  BP: 121/62 (26 Aug 2021 06:46) (109/66 - 123/67)  BP(mean): --  RR: 17 (26 Aug 2021 06:46) (17 - 18)  SpO2: 97% (26 Aug 2021 06:46) (95% - 97%)  Daily Height in cm: 165.1 (26 Aug 2021 06:46)    Daily     GENERAL: no acute distress  NEURO: alert  HEENT: anicteric sclera, no conjunctival pallor appreciated  CHEST: no respiratory distress, no accessory muscle use  CARDIAC: regular rate, +S1/S2  ABDOMEN: soft, nondistended, nontender, no rebound or guarding  EXTREMITIES: warm, well perfused, no edema  SKIN: no lesions noted    LABS: reviewed                        9.2    5.76  )-----------( 123      ( 26 Aug 2021 06:36 )             27.8     08-26    134<L>  |  104  |  23  ----------------------------<  99  3.7   |  20<L>  |  0.50    Ca    8.3<L>      26 Aug 2021 06:36  Phos  2.9     08-26  Mg     2.10     08-26    TPro  6.3  /  Alb  3.4  /  TBili  0.5  /  DBili  x   /  AST  16  /  ALT  13  /  AlkPhos  44  08-26    LIVER FUNCTIONS - ( 26 Aug 2021 06:36 )  Alb: 3.4 g/dL / Pro: 6.3 g/dL / ALK PHOS: 44 U/L / ALT: 13 U/L / AST: 16 U/L / GGT: x             Interval Diagnostic Studies: see sunrise for full report  
Patient is a 70y old  Female who presents with a chief complaint of Vomiting blood (27 Aug 2021 10:06)      Interval Events:   No acute events overnight.  Patient denies any acute symptoms at this time.    ROS:   A 12-point ROS was performed and negative except as noted in HPI.    Hospital Medications:  pantoprazole Infusion 8 mG/Hr IV Continuous <Continuous>      PHYSICAL EXAM:   Vital Signs:  Vital Signs Last 24 Hrs  T(C): 36.6 (27 Aug 2021 05:30), Max: 36.6 (26 Aug 2021 21:44)  T(F): 97.9 (27 Aug 2021 05:30), Max: 97.9 (27 Aug 2021 05:30)  HR: 70 (27 Aug 2021 05:30) (70 - 76)  BP: 129/67 (27 Aug 2021 05:30) (129/67 - 133/70)  BP(mean): --  RR: 18 (27 Aug 2021 05:30) (18 - 18)  SpO2: 97% (27 Aug 2021 05:30) (96% - 97%)  Daily     Daily     GENERAL: no acute distress  NEURO: alert  HEENT: anicteric sclera, no conjunctival pallor appreciated  CHEST: no respiratory distress, no accessory muscle use  CARDIAC: regular rate, +S1/S2  ABDOMEN: soft, nondistended, nontender, no rebound or guarding  EXTREMITIES: warm, well perfused, no edema  SKIN: no lesions noted    LABS: reviewed                        9.4    5.51  )-----------( 125      ( 27 Aug 2021 06:26 )             27.9     08-27    138  |  104  |  18  ----------------------------<  115<H>  4.1   |  22  |  0.53    Ca    8.5      27 Aug 2021 06:26  Phos  2.9     08-26  Mg     2.10     08-26    TPro  6.4  /  Alb  3.7  /  TBili  0.5  /  DBili  x   /  AST  20  /  ALT  16  /  AlkPhos  48  08-27    LIVER FUNCTIONS - ( 27 Aug 2021 06:26 )  Alb: 3.7 g/dL / Pro: 6.4 g/dL / ALK PHOS: 48 U/L / ALT: 16 U/L / AST: 20 U/L / GGT: x             Interval Diagnostic Studies: see sunrise for full report  
Patient is a 70y old  Female who presents with a chief complaint of Vomiting blood (26 Aug 2021 11:22)      HPI:  No new symptoms. CBC is being monitored    PAST MEDICAL & SURGICAL HISTORY:  HTN (hypertension)    H/O Helicobacter infection    No significant past surgical history        Review of Systems:   CONSTITUTIONAL: No fever, weight loss, or fatigue  EYES: No eye pain, visual disturbances, or discharge  ENMT:  No difficulty hearing, tinnitus, vertigo; No sinus or throat pain  NECK: No pain or stiffness  BREASTS: No pain, masses, or nipple discharge  RESPIRATORY: No cough, wheezing, chills or hemoptysis; No shortness of breath  CARDIOVASCULAR: No chest pain, palpitations, dizziness, or leg swelling  GASTROINTESTINAL: No abdominal or epigastric pain. No nausea, vomiting, or hematemesis; No diarrhea or constipation. No melena or hematochezia.  GENITOURINARY: No dysuria, frequency, hematuria, or incontinence  NEUROLOGICAL: No headaches, memory loss, loss of strength, numbness, or tremors  SKIN: No itching, burning, rashes, or lesions   LYMPH NODES: No enlarged glands  ENDOCRINE: No heat or cold intolerance; No hair loss  MUSCULOSKELETAL: No joint pain or swelling; No muscle, back, or extremity pain  PSYCHIATRIC: No depression, anxiety, mood swings, or difficulty sleeping  HEME/LYMPH: No easy bruising, or bleeding gums  ALLERY AND IMMUNOLOGIC: No hives or eczema    Allergies    No Known Allergies    Intolerances        Social History:     FAMILY HISTORY:  No pertinent family history in first degree relatives        MEDICATIONS  (STANDING):  pantoprazole Infusion 8 mG/Hr (10 mL/Hr) IV Continuous <Continuous>    MEDICATIONS  (PRN):        CAPILLARY BLOOD GLUCOSE        I&O's Summary      PHYSICAL EXAM:  Vital Signs Last 24 Hrs  T(C): 36.7 (26 Aug 2021 11:28), Max: 36.7 (26 Aug 2021 11:28)  T(F): 98.1 (26 Aug 2021 11:28), Max: 98.1 (26 Aug 2021 11:28)  HR: 79 (26 Aug 2021 11:28) (77 - 79)  BP: 105/63 (26 Aug 2021 11:28) (105/63 - 121/62)  BP(mean): --  RR: 18 (26 Aug 2021 11:28) (17 - 18)  SpO2: 97% (26 Aug 2021 11:28) (97% - 97%)    GENERAL: NAD, well-developed  HEAD:  Atraumatic, Normocephalic  EYES: EOMI, PERRLA, conjunctiva and sclera clear  NECK: Supple, No JVD  CHEST/LUNG: Clear to auscultation bilaterally; No wheeze  HEART: Regular rate and rhythm; No murmurs, rubs, or gallops  ABDOMEN: Soft, Nontender, Nondistended; Bowel sounds present  EXTREMITIES:  2+ Peripheral Pulses, No clubbing, cyanosis, or edema  PSYCH: AAOx3  NEUROLOGY: non-focal  SKIN: No rashes or lesions    LABS:                        9.3    4.34  )-----------( 121      ( 26 Aug 2021 18:08 )             27.3     08-26    134<L>  |  104  |  23  ----------------------------<  99  3.7   |  20<L>  |  0.50    Ca    8.3<L>      26 Aug 2021 06:36  Phos  2.9     08-26  Mg     2.10     08-26    TPro  6.3  /  Alb  3.4  /  TBili  0.5  /  DBili  x   /  AST  16  /  ALT  13  /  AlkPhos  44  08-26    PT/INR - ( 24 Aug 2021 21:55 )   PT: 13.6 sec;   INR: 1.20 ratio         PTT - ( 24 Aug 2021 21:55 )  PTT:27.1 sec          RADIOLOGY & ADDITIONAL TESTS:    Imaging Personally Reviewed:    Consultant(s) Notes Reviewed:      Care Discussed with Consultants/Other Providers:

## 2021-08-27 NOTE — PROGRESS NOTE ADULT - ASSESSMENT
71 y/o female with PMHx of HTN, H pylori s/p treatment in 2005, ovarian tumor s/p removal in 2013 presents to ED vomiting bloody material and abdominal pain post EGD 4 days ago with  Dr Leija for biopsy of 8-9 mm gastric mass. Admitted for hematemesis management.   In ED, patient was found to be Covid positive. As per son, patient was vaccinated with Pfizer vaccine, last dose 3/27/21. Patient was not tested prior to EGD 4 days ago.       
Edi Allison is a 70 year old female with history of hypertension, H pylori s/p treatment in 2005, ovarian tumor s/p removal in 2013 who presents with one episode of hematemesis.    # Hematemesis  # 8-9mm gastric mass  # History of H pylori s/p treatment  # COVID-19 positive  One episode of hematemesis in the setting of recent EGD 4 days prior at private GI.  Patient also tested positive for COVID-19 despite being vaccinated.  Differential diagnosis of upper GI bleeding includes esophagitis, gastritis, peptic ulcer disease, H pylori infection, varices, portal hypertensive gastropathy, GAVE, arteriovenous malformation [AVM], malignancy, Monika-Marcus tear, Dieulafoy lesion, or Brett lesions.  Patient does have family history of gastric cancer in patient's father and is being followed by private GI physician for 8-9mm gastric mass.  Biopsies from 8/20/2021 remain pending.  Records obtained from private GI physician, Dr. Omid Leija, in [place]:  2005: underwent treatment for H pylori  EGD 9/25/2019: linear erythema and irregular Z-line [negative for Trinidad's], gastritis, 8mm mass in gastric body along greater curvature  Colonoscopy 9/30/2019: 2-3mm polyp in proximal transverse colon, internal hemorrhoids  EGD 8/20/2021: 8-9mm mass in gastric body along greater curvature [pathology from gastric biopsies pending], gastritis in antrum and body, non-erosive esophagitis    Recommendations:  -management of COVID-19 per primary team-trend vitals, CBC, and monitor for clinical signs of bleeding  -maintain active type and screen  -transfusion goal to maintain hemoglobin >/= 7.0  -rule out other causes for anemia [consider iron studies, ferritin, vitamin B12, folate]  -avoid NSAIDs  -PPI IV BID acceptable for now, ok to transition to oral  -obtained records from private GI physician's office, Dr. Omid Leija, in [place].  Records of reports as above.  -no further signs of overt GI bleeding, vital signs and hemoglobin stable, will sign off at this time, please call if any further questions arise      Tiburcio Wong, PGY-4  GI/Hepatology Fellow    MONDAY-FRIDAY 8AM-5PM:  Available via [employer] Teams  Pager# [phone] ([facility]) or [phone] ([facility])  GI Phone# [phone] ([facility])    NON-URGENT CONSULTS:  Please email [email or web address]  AT NIGHT AND ON WEEKENDS:  Contact on-call GI fellow via answering service ([phone]) from 5pm-8am and on weekends/holidays
Edi Allison is a 70 year old female with history of hypertension, H pylori s/p treatment in 2005, ovarian tumor s/p removal in 2013 who presents with one episode of hematemesis.    # Hematemesis  # 8-9mm gastric mass  # History of H pylori s/p treatment  # COVID-19 positive  One episode of hematemesis in the setting of recent EGD 4 days prior at private .  Patient also tested positive for COVID-19 despite being vaccinated.  Differential diagnosis of upper GI bleeding includes esophagitis, gastritis, peptic ulcer disease, H pylori infection, varices, portal hypertensive gastropathy, GAVE, arteriovenous malformation [AVM], malignancy, Monika-Marcus tear, Dieulafoy lesion, or Brett lesions.  Patient does have family history of gastric cancer in patient's father and is being followed by private GI physician for 8-9mm gastric mass.  Biopsies from 8/20/2021 remain pending.  Records obtained from private GI physician, Dr. Omid Leija, in [place]:  2005: underwent treatment for H pylori  EGD 9/25/2019: linear erythema and irregular Z-line [negative for Trinidad's], gastritis, 8mm mass in gastric body along greater curvature  Colonoscopy 9/30/2019: 2-3mm polyp in proximal transverse colon, internal hemorrhoids  EGD 8/20/2021: 8-9mm mass in gastric body along greater curvature [pathology from gastric biopsies pending], gastritis in antrum and body, non-erosive esophagitis    Recommendations:  -management of COVID-19 per primary team-trend vitals, CBC, and monitor for clinical signs of bleeding  -maintain active type and screen  -transfusion goal to maintain hemoglobin >/= 7.0  -rule out other causes for anemia [consider iron studies, ferritin, vitamin B12, folate]  -avoid NSAIDs  -PPI IV BID acceptable for now  -obtained records from private GI physician's office, Dr. Omid Leija, in [place].  Records of reports as above.  -would consider repeat endoscopy if patient develops worsening anemia without clear etiology, signs of overt GI bleeding, and medically optimized prior to procedure.  However, given that patient is COVID-19 PCR positive would favor conservative management      Tiburcio Wong, PGY-4  GI/Hepatology Fellow    MONDAY-FRIDAY 8AM-5PM:  Available via Microsoft Teams  Pager# [phone] ([facility]) or [phone] ([facility])  GI Phone# [phone] ([facility])    NON-URGENT CONSULTS:  Please email [email or web address]  AT NIGHT AND ON WEEKENDS:  Contact on-call GI fellow via answering service ([phone]) from 5pm-8am and on weekends/holidays

## 2021-08-27 NOTE — PROGRESS NOTE ADULT - ATTENDING COMMENTS
69 y/o woman with HTN, h pylori s/p tx, and known 8mm gastric mass s/p EGD 4 days ago presents to the ER not feeling well since EGD and with an episode of hematemesis. Pt also reported nausea and continued vomiting in the ER. Found covid19 positive, denied sob but had some cough. Suspect overall malaise and portion of n/v likely from COVID19 infection with hematemesis a  result of recent biopsy with vomiting. CT abdomen without perf. Pt denies any nausea at present nor further hematemesis. Continue IV PPI, will give clears. GI recs appreciated. trend cbc
70 year old female with hematemesis. EGD done 4 days ago did not show bleeding source. Hgb is stable, no further bleeding. Pt is Covid positive. Plan: Trend H/H.
1 bout of hematemesis but no further bleeding. Will sign off.

## 2021-08-27 NOTE — DISCHARGE NOTE PROVIDER - CARE PROVIDER_API CALL
Olga Truong)  Internal Medicine  [address] [place] [address]  Phone: [phone]  Fax: [phone]  Established Patient  Follow Up Time:    Olga Truong)  Internal Medicine  [address] [place] [address]  Phone: [phone]  Fax: [phone]  Established Patient  Follow Up Time:     Omid Leiaj)  [address] [place] [address] [address]  [place] [address]  Phone: [phone]  Fax: [phone]  Established Patient  Follow Up Time:

## 2021-08-27 NOTE — DISCHARGE NOTE NURSING/CASE MANAGEMENT/SOCIAL WORK - PATIENT PORTAL LINK FT
You can access the FollowMyHealth Patient Portal offered by [facility] by registering at the following website: [email or web address]. By joining [facility]’s FollowMyHealth portal, you will also be able to view your health information using other applications (apps) compatible with our system.

## 2021-08-27 NOTE — DISCHARGE NOTE PROVIDER - NSDCMRMEDTOKEN_GEN_ALL_CORE_FT
Calcium 500+D oral tablet, chewable: 1 tab(s) orally 2 times a day  omeprazole 40 mg oral delayed release capsule: 1 cap(s) orally once a day  Vitamin D3 50 mcg (2000 intl units) oral tablet: 1 tab(s) orally once a day

## 2021-08-27 NOTE — DISCHARGE NOTE NURSING/CASE MANAGEMENT/SOCIAL WORK - NSDCPEFALRISK_GEN_ALL_CORE
For information on Fall & injury Prevention, visit [email or web address]

## 2021-08-27 NOTE — DISCHARGE NOTE PROVIDER - CARE PROVIDERS DIRECT ADDRESSES
[email or web address]
